# Patient Record
Sex: FEMALE | Race: WHITE
[De-identification: names, ages, dates, MRNs, and addresses within clinical notes are randomized per-mention and may not be internally consistent; named-entity substitution may affect disease eponyms.]

---

## 2019-07-09 ENCOUNTER — HOSPITAL ENCOUNTER (OUTPATIENT)
Dept: HOSPITAL 94 - PAS | Age: 74
Discharge: HOME | End: 2019-07-09
Attending: SURGERY
Payer: MEDICARE

## 2019-07-09 VITALS — SYSTOLIC BLOOD PRESSURE: 197 MMHG | DIASTOLIC BLOOD PRESSURE: 99 MMHG

## 2019-07-09 VITALS — WEIGHT: 154.54 LBS | BODY MASS INDEX: 27.38 KG/M2 | HEIGHT: 63 IN

## 2019-07-09 VITALS — SYSTOLIC BLOOD PRESSURE: 185 MMHG | DIASTOLIC BLOOD PRESSURE: 73 MMHG

## 2019-07-09 VITALS — DIASTOLIC BLOOD PRESSURE: 80 MMHG | SYSTOLIC BLOOD PRESSURE: 191 MMHG

## 2019-07-09 VITALS — SYSTOLIC BLOOD PRESSURE: 191 MMHG | DIASTOLIC BLOOD PRESSURE: 80 MMHG

## 2019-07-09 VITALS — DIASTOLIC BLOOD PRESSURE: 63 MMHG | SYSTOLIC BLOOD PRESSURE: 174 MMHG

## 2019-07-09 VITALS — DIASTOLIC BLOOD PRESSURE: 83 MMHG | SYSTOLIC BLOOD PRESSURE: 182 MMHG

## 2019-07-09 DIAGNOSIS — J45.909: ICD-10-CM

## 2019-07-09 DIAGNOSIS — Z03.89: Primary | ICD-10-CM

## 2019-07-09 DIAGNOSIS — E11.9: ICD-10-CM

## 2019-07-09 DIAGNOSIS — I10: ICD-10-CM

## 2019-07-09 DIAGNOSIS — K21.9: ICD-10-CM

## 2019-07-09 LAB
ALBUMIN SERPL BCP-MCNC: 3.4 G/DL (ref 3.4–5)
ALBUMIN/GLOB SERPL: 1 {RATIO} (ref 1.1–1.5)
ALP SERPL-CCNC: 68 IU/L (ref 46–116)
ALT SERPL W P-5'-P-CCNC: 23 U/L (ref 12–78)
ANION GAP SERPL CALCULATED.3IONS-SCNC: 9 MMOL/L (ref 8–16)
AST SERPL W P-5'-P-CCNC: 20 U/L (ref 10–37)
BASOPHILS # BLD AUTO: 0 X10'3 (ref 0–0.2)
BASOPHILS NFR BLD AUTO: 0.9 % (ref 0–1)
BILIRUB SERPL-MCNC: 1.4 MG/DL (ref 0.1–1)
BUN SERPL-MCNC: 23 MG/DL (ref 7–18)
BUN/CREAT SERPL: 25 (ref 6.6–38)
CALCIUM SERPL-MCNC: 9.4 MG/DL (ref 8.5–10.1)
CHLORIDE SERPL-SCNC: 108 MMOL/L (ref 99–107)
CO2 SERPL-SCNC: 24.1 MMOL/L (ref 24–32)
CREAT SERPL-MCNC: 0.92 MG/DL (ref 0.4–0.9)
EOSINOPHIL # BLD AUTO: 0.1 X10'3 (ref 0–0.9)
EOSINOPHIL NFR BLD AUTO: 1.6 % (ref 0–6)
ERYTHROCYTE [DISTWIDTH] IN BLOOD BY AUTOMATED COUNT: 13.4 % (ref 11.5–14.5)
GFR SERPL CREATININE-BSD FRML MDRD: 60 ML/MIN
GLUCOSE SERPL-MCNC: 129 MG/DL (ref 70–104)
HCT VFR BLD AUTO: 39.9 % (ref 35–45)
HGB BLD-MCNC: 13.5 G/DL (ref 12–16)
LYMPHOCYTES # BLD AUTO: 1.6 X10'3 (ref 1.1–4.8)
LYMPHOCYTES NFR BLD AUTO: 28.9 % (ref 21–51)
MCH RBC QN AUTO: 30.5 PG (ref 27–31)
MCHC RBC AUTO-ENTMCNC: 33.9 G/DL (ref 33–36.5)
MCV RBC AUTO: 89.9 FL (ref 78–98)
MONOCYTES # BLD AUTO: 0.5 X10'3 (ref 0–0.9)
MONOCYTES NFR BLD AUTO: 9.2 % (ref 2–12)
NEUTROPHILS # BLD AUTO: 3.4 X10'3 (ref 1.8–7.7)
NEUTROPHILS NFR BLD AUTO: 59.4 % (ref 42–75)
PLATELET # BLD AUTO: 204 X10'3 (ref 140–440)
PMV BLD AUTO: 9.2 FL (ref 7.4–10.4)
POTASSIUM SERPL-SCNC: 4.2 MMOL/L (ref 3.5–5.1)
PROT SERPL-MCNC: 6.9 G/DL (ref 6.4–8.2)
RBC # BLD AUTO: 4.44 X10'6 (ref 4.2–5.6)
SODIUM SERPL-SCNC: 141 MMOL/L (ref 135–145)
WBC # BLD AUTO: 5.7 X10'3 (ref 4.5–11)

## 2019-07-09 PROCEDURE — 88313 SPECIAL STAINS GROUP 2: CPT

## 2019-07-09 PROCEDURE — 37609 LIGATION/BX TEMPORAL ARTERY: CPT

## 2019-07-09 PROCEDURE — 36415 COLL VENOUS BLD VENIPUNCTURE: CPT

## 2019-07-09 PROCEDURE — 88305 TISSUE EXAM BY PATHOLOGIST: CPT

## 2019-07-09 PROCEDURE — 93005 ELECTROCARDIOGRAM TRACING: CPT

## 2019-07-09 PROCEDURE — 80053 COMPREHEN METABOLIC PANEL: CPT

## 2019-07-09 PROCEDURE — 85025 COMPLETE CBC W/AUTO DIFF WBC: CPT

## 2019-07-09 NOTE — NUR
ALL DC CRITERIA HAS BEEN MET. IV TAKEN OUT WITHOUT COMPLICATIONS. ALL INSTRUCTIONS COVERED 
AND ALL QUESTIONS ANSWERED. DRESSINGS CDI. OUT VIA WHEELCHAIR TO PERSONAL VEHICLE WHERE 
PATIENT WAS SECURED IN AND DRIVEN HOME BY FAMILY. 


-------------------------------------------------------------------------------

Addendum: 07/09/19 at 1428 by Madhu Swan RN, RN

-------------------------------------------------------------------------------

Amended: Links added.

## 2019-07-09 NOTE — NUR
Received from OR via [facility] , accompanied by Anesthesiologist LINUS and report given by 
Anesthesiolgist. PATIENT WITH 20G PIV IN LEFT WRIST AREA. DENIES PAIN. RIGHT TEMPORAL 
DRESSING IS CDI. NO DRAINAGE PRESENT. VSS AT THIS TIME. 


-------------------------------------------------------------------------------

Addendum: 07/09/19 at 1332 by Madhu Swan RN, RN

-------------------------------------------------------------------------------

Amended: Links added.

## 2019-10-31 ENCOUNTER — HOSPITAL ENCOUNTER (EMERGENCY)
Dept: HOSPITAL 94 - ER | Age: 74
Discharge: HOME | End: 2019-10-31
Payer: MEDICARE

## 2019-10-31 VITALS — SYSTOLIC BLOOD PRESSURE: 192 MMHG | DIASTOLIC BLOOD PRESSURE: 90 MMHG

## 2019-10-31 VITALS — HEIGHT: 63 IN | BODY MASS INDEX: 26.95 KG/M2 | WEIGHT: 152.12 LBS

## 2019-10-31 DIAGNOSIS — I10: ICD-10-CM

## 2019-10-31 DIAGNOSIS — Z88.2: ICD-10-CM

## 2019-10-31 DIAGNOSIS — E11.9: ICD-10-CM

## 2019-10-31 DIAGNOSIS — G89.29: ICD-10-CM

## 2019-10-31 DIAGNOSIS — R51: Primary | ICD-10-CM

## 2019-10-31 DIAGNOSIS — Z90.49: ICD-10-CM

## 2019-10-31 DIAGNOSIS — Z88.8: ICD-10-CM

## 2019-10-31 DIAGNOSIS — Z88.0: ICD-10-CM

## 2019-10-31 DIAGNOSIS — Z88.1: ICD-10-CM

## 2019-10-31 DIAGNOSIS — Z98.890: ICD-10-CM

## 2019-10-31 DIAGNOSIS — Z79.899: ICD-10-CM

## 2019-10-31 DIAGNOSIS — E78.00: ICD-10-CM

## 2019-10-31 DIAGNOSIS — J45.909: ICD-10-CM

## 2019-10-31 DIAGNOSIS — Z90.710: ICD-10-CM

## 2019-10-31 PROCEDURE — 96372 THER/PROPH/DIAG INJ SC/IM: CPT

## 2019-10-31 PROCEDURE — 99283 EMERGENCY DEPT VISIT LOW MDM: CPT

## 2019-12-17 ENCOUNTER — HOSPITAL ENCOUNTER (EMERGENCY)
Dept: HOSPITAL 94 - ER | Age: 74
Discharge: LEFT BEFORE DISCHARGE | End: 2019-12-17
Payer: MEDICARE

## 2019-12-17 VITALS — DIASTOLIC BLOOD PRESSURE: 106 MMHG | SYSTOLIC BLOOD PRESSURE: 194 MMHG

## 2019-12-17 VITALS — HEIGHT: 63 IN | BODY MASS INDEX: 27.3 KG/M2 | WEIGHT: 154.1 LBS

## 2019-12-17 DIAGNOSIS — Z53.21: ICD-10-CM

## 2019-12-17 DIAGNOSIS — I10: Primary | ICD-10-CM
